# Patient Record
Sex: FEMALE | URBAN - METROPOLITAN AREA
[De-identification: names, ages, dates, MRNs, and addresses within clinical notes are randomized per-mention and may not be internally consistent; named-entity substitution may affect disease eponyms.]

---

## 2021-05-12 ENCOUNTER — APPOINTMENT (RX ONLY)
Dept: URBAN - METROPOLITAN AREA CLINIC 158 | Facility: CLINIC | Age: 76
Setting detail: DERMATOLOGY
End: 2021-05-12

## 2021-05-12 DIAGNOSIS — L57.8 OTHER SKIN CHANGES DUE TO CHRONIC EXPOSURE TO NONIONIZING RADIATION: ICD-10-CM

## 2021-05-12 DIAGNOSIS — L57.0 ACTINIC KERATOSIS: ICD-10-CM

## 2021-05-12 DIAGNOSIS — L82.1 OTHER SEBORRHEIC KERATOSIS: ICD-10-CM

## 2021-05-12 PROBLEM — D48.5 NEOPLASM OF UNCERTAIN BEHAVIOR OF SKIN: Status: ACTIVE | Noted: 2021-05-12

## 2021-05-12 PROCEDURE — ? BIOPSY BY SHAVE METHOD

## 2021-05-12 PROCEDURE — ? COUNSELING

## 2021-05-12 PROCEDURE — ? TREATMENT REGIMEN

## 2021-05-12 PROCEDURE — ? LIQUID NITROGEN

## 2021-05-12 PROCEDURE — 11103 TANGNTL BX SKIN EA SEP/ADDL: CPT

## 2021-05-12 PROCEDURE — 99203 OFFICE O/P NEW LOW 30 MIN: CPT | Mod: 25

## 2021-05-12 PROCEDURE — 11102 TANGNTL BX SKIN SINGLE LES: CPT

## 2021-05-12 PROCEDURE — 17000 DESTRUCT PREMALG LESION: CPT | Mod: 59

## 2021-05-12 ASSESSMENT — LOCATION ZONE DERM
LOCATION ZONE: TRUNK
LOCATION ZONE: NOSE

## 2021-05-12 ASSESSMENT — LOCATION SIMPLE DESCRIPTION DERM
LOCATION SIMPLE: NOSE
LOCATION SIMPLE: CHEST

## 2021-05-12 ASSESSMENT — LOCATION DETAILED DESCRIPTION DERM
LOCATION DETAILED: UPPER STERNUM
LOCATION DETAILED: NASAL SUPRATIP

## 2021-05-14 ENCOUNTER — RX ONLY (OUTPATIENT)
Age: 76
Setting detail: RX ONLY
End: 2021-05-14

## 2021-05-14 RX ORDER — CLOBETASOL PROPIONATE 0.5 MG/G
1 CREAM TOPICAL BID
Qty: 1 | Refills: 1 | Status: ERX | COMMUNITY
Start: 2021-05-14

## 2021-08-04 ENCOUNTER — APPOINTMENT (RX ONLY)
Dept: URBAN - METROPOLITAN AREA CLINIC 158 | Facility: CLINIC | Age: 76
Setting detail: DERMATOLOGY
End: 2021-08-04

## 2021-08-04 DIAGNOSIS — L43.8 OTHER LICHEN PLANUS: ICD-10-CM

## 2021-08-04 DIAGNOSIS — L28.1 PRURIGO NODULARIS: ICD-10-CM

## 2021-08-04 DIAGNOSIS — L56.8 OTHER SPECIFIED ACUTE SKIN CHANGES DUE TO ULTRAVIOLET RADIATION: ICD-10-CM

## 2021-08-04 DIAGNOSIS — K13.0 DISEASES OF LIPS: ICD-10-CM

## 2021-08-04 PROCEDURE — ? COUNSELING

## 2021-08-04 PROCEDURE — ? ORDER TESTS

## 2021-08-04 PROCEDURE — ? OTHER

## 2021-08-04 PROCEDURE — ? TREATMENT REGIMEN

## 2021-08-04 PROCEDURE — 99214 OFFICE O/P EST MOD 30 MIN: CPT

## 2021-08-04 PROCEDURE — ? PRESCRIPTION

## 2021-08-04 RX ORDER — HYDROCORTISONE 25 MG/G
1 OINTMENT TOPICAL QD
Qty: 1 | Refills: 1 | Status: ERX | COMMUNITY
Start: 2021-08-04

## 2021-08-04 RX ORDER — FLUOROURACIL 2 G/40G
1 CREAM TOPICAL BID
Qty: 1 | Refills: 0 | Status: ERX | COMMUNITY
Start: 2021-08-04

## 2021-08-04 RX ADMIN — HYDROCORTISONE 1: 25 OINTMENT TOPICAL at 00:00

## 2021-08-04 RX ADMIN — FLUOROURACIL 1: 2 CREAM TOPICAL at 00:00

## 2021-08-04 ASSESSMENT — LOCATION DETAILED DESCRIPTION DERM
LOCATION DETAILED: SUPERIOR THORACIC SPINE
LOCATION DETAILED: LEFT DISTAL PRETIBIAL REGION
LOCATION DETAILED: LEFT PROXIMAL DORSAL FOREARM
LOCATION DETAILED: RIGHT INFERIOR VERMILION LIP
LOCATION DETAILED: RIGHT DISTAL PRETIBIAL REGION
LOCATION DETAILED: RIGHT MEDIAL SUPERIOR CHEST
LOCATION DETAILED: LEFT SUPERIOR VERMILION LIP
LOCATION DETAILED: RIGHT PROXIMAL PRETIBIAL REGION
LOCATION DETAILED: RIGHT SUPERIOR VERMILION LIP
LOCATION DETAILED: RIGHT PROXIMAL DORSAL FOREARM

## 2021-08-04 ASSESSMENT — LOCATION SIMPLE DESCRIPTION DERM
LOCATION SIMPLE: LEFT LIP
LOCATION SIMPLE: RIGHT FOREARM
LOCATION SIMPLE: CHEST
LOCATION SIMPLE: RIGHT PRETIBIAL REGION
LOCATION SIMPLE: RIGHT LIP
LOCATION SIMPLE: LEFT PRETIBIAL REGION
LOCATION SIMPLE: UPPER BACK
LOCATION SIMPLE: LEFT FOREARM

## 2021-08-04 ASSESSMENT — LOCATION ZONE DERM
LOCATION ZONE: LIP
LOCATION ZONE: ARM
LOCATION ZONE: TRUNK
LOCATION ZONE: LEG

## 2021-08-04 NOTE — PROCEDURE: TREATMENT REGIMEN
Otc Regimen: Moisturizer daily to help prevent scratching
Detail Level: Zone
Initiate Treatment: Clobetasol cream - apply twice a day for 2 weeks to affected areas (never to face or groin )

## 2021-08-04 NOTE — PROCEDURE: OTHER
Detail Level: Zone
Other (Free Text): Lichen planus has been spreading. She tried Clobetasol x 2 weeks in the past, which did not help. She is very pruritic and has developed some PG. Discussed using chilled moisturizer and restarting the clobetasol If not better, will consider other options. Will check Hep C today too. Follow up in 2 weeks, sooner if needed.
Note Text (......Xxx Chief Complaint.): This diagnosis correlates with the
Render Risk Assessment In Note?: no

## 2021-08-04 NOTE — PROCEDURE: ORDER TESTS
Performing Laboratory: [id number]
Bill For Surgical Tray: no
Billing Type: Third-Party Bill
Expected Date Of Service: 08/04/2021

## 2021-08-21 ENCOUNTER — OFFICE VISIT (OUTPATIENT)
Dept: URBAN - METROPOLITAN AREA CLINIC 51 | Facility: CLINIC | Age: 76
End: 2021-08-21
Payer: MEDICARE

## 2021-08-21 DIAGNOSIS — H25.13 AGE-RELATED NUCLEAR CATARACT, BILATERAL: ICD-10-CM

## 2021-08-21 PROCEDURE — 99204 OFFICE O/P NEW MOD 45 MIN: CPT | Performed by: OPTOMETRIST

## 2021-08-21 PROCEDURE — 92020 GONIOSCOPY: CPT | Performed by: OPTOMETRIST

## 2021-08-21 ASSESSMENT — VISUAL ACUITY
OD: 20/25
OS: 20/20

## 2021-08-21 ASSESSMENT — INTRAOCULAR PRESSURE
OD: 18
OS: 18

## 2021-08-21 NOTE — IMPRESSION/PLAN
Impression: Anatomical narrow angle, bilateral: H40.033. Plan: Due to Parul Celia and gonio view OU. Refer to glaucoma for LPI consideration. Pt educ on condition and the importance of no DFE at this time. Reviewed importance for safe DFE. No gtts needed at this time. Pt will need to return to general clinic after LPIs for completeness of ocular health assessment. Pt understands process and next appointment is for consult only and not for LPIs.

## 2021-08-25 ENCOUNTER — APPOINTMENT (RX ONLY)
Dept: URBAN - METROPOLITAN AREA CLINIC 158 | Facility: CLINIC | Age: 76
Setting detail: DERMATOLOGY
End: 2021-08-25

## 2021-08-25 DIAGNOSIS — L43.8 OTHER LICHEN PLANUS: ICD-10-CM | Status: IMPROVED

## 2021-08-25 DIAGNOSIS — L30.9 DERMATITIS, UNSPECIFIED: ICD-10-CM

## 2021-08-25 DIAGNOSIS — K13.0 DISEASES OF LIPS: ICD-10-CM | Status: WELL CONTROLLED

## 2021-08-25 PROCEDURE — ? OTHER

## 2021-08-25 PROCEDURE — 99213 OFFICE O/P EST LOW 20 MIN: CPT

## 2021-08-25 PROCEDURE — ? COUNSELING

## 2021-08-25 ASSESSMENT — LOCATION SIMPLE DESCRIPTION DERM
LOCATION SIMPLE: RIGHT LIP
LOCATION SIMPLE: UPPER BACK
LOCATION SIMPLE: CHEST
LOCATION SIMPLE: LEFT FOREARM
LOCATION SIMPLE: LEFT PRETIBIAL REGION
LOCATION SIMPLE: RIGHT PRETIBIAL REGION
LOCATION SIMPLE: LEFT LIP
LOCATION SIMPLE: RIGHT FOREARM

## 2021-08-25 ASSESSMENT — LOCATION DETAILED DESCRIPTION DERM
LOCATION DETAILED: RIGHT DISTAL PRETIBIAL REGION
LOCATION DETAILED: LEFT SUPERIOR VERMILION LIP
LOCATION DETAILED: RIGHT MEDIAL SUPERIOR CHEST
LOCATION DETAILED: SUPERIOR THORACIC SPINE
LOCATION DETAILED: LEFT INFERIOR VERMILION LIP
LOCATION DETAILED: RIGHT PROXIMAL DORSAL FOREARM
LOCATION DETAILED: LEFT DISTAL PRETIBIAL REGION
LOCATION DETAILED: LEFT PROXIMAL DORSAL FOREARM
LOCATION DETAILED: RIGHT SUPERIOR VERMILION LIP

## 2021-08-25 ASSESSMENT — LOCATION ZONE DERM
LOCATION ZONE: ARM
LOCATION ZONE: LEG
LOCATION ZONE: TRUNK
LOCATION ZONE: LIP

## 2021-08-25 NOTE — PROCEDURE: OTHER
Detail Level: Zone
Other (Free Text): TODAYS NOTE- Lichen planus has improved. Patient has been applying clobetasol a few times a week. Advised to use clobetasol 2-3 times a week as needed. \\n\\nLNV-Lichen planus has been spreading. She tried Clobetasol x 2 weeks in the past, which did not help. She is very pruritic and has developed some PG. Discussed using chilled moisturizer and restarting the clobetasol If not better, will consider other options. Will check Hep C today too. Follow up in 2 weeks, sooner if needed.
Note Text (......Xxx Chief Complaint.): This diagnosis correlates with the
Render Risk Assessment In Note?: no
Other (Free Text): Patient has been applying hydrocortisone and has had improvement.
Other (Free Text): Noticed a new lesion on the inside of the lower lip on Monday.  Advised to continue hydrocortisone around the lip. Start an antihistamine once a day. If no improvement, she will return for follow up.

## 2021-09-17 ENCOUNTER — OFFICE VISIT (OUTPATIENT)
Dept: URBAN - METROPOLITAN AREA CLINIC 44 | Facility: CLINIC | Age: 76
End: 2021-09-17
Payer: MEDICARE

## 2021-09-17 PROCEDURE — 76514 ECHO EXAM OF EYE THICKNESS: CPT | Performed by: OPHTHALMOLOGY

## 2021-09-17 PROCEDURE — 99204 OFFICE O/P NEW MOD 45 MIN: CPT | Performed by: OPHTHALMOLOGY

## 2021-09-17 PROCEDURE — 92082 INTERMEDIATE VISUAL FIELD XM: CPT | Performed by: OPHTHALMOLOGY

## 2021-09-17 PROCEDURE — 92020 GONIOSCOPY: CPT | Performed by: OPHTHALMOLOGY

## 2021-09-17 PROCEDURE — 92133 CPTRZD OPH DX IMG PST SGM ON: CPT | Performed by: OPHTHALMOLOGY

## 2021-09-17 RX ORDER — PREDNISOLONE ACETATE 10 MG/ML
1 % SUSPENSION/ DROPS OPHTHALMIC
Qty: 5 | Refills: 1 | Status: INACTIVE
Start: 2021-09-17 | End: 2021-12-09

## 2021-09-17 ASSESSMENT — INTRAOCULAR PRESSURE
OS: 20
OD: 20

## 2021-09-17 NOTE — IMPRESSION/PLAN
Impression: Age-related nuclear cataract, bilateral: H25.13. Plan: eval in general clinic after LPIs.

## 2021-09-17 NOTE — IMPRESSION/PLAN
Impression: Anatomical narrow angle, bilateral: H40.033.
+copd
+possible family hx (maternal grandmother?)  Plan: PLAN: Appropriate testing ordered upon review of referring notes ; FDT has scatter ou and Angles appear narrow so recommend LPI OU. Signs and symptoms of angle closure discussed. Call or RTC ASAP if symptoms occur. Avoid medications with glaucoma warnings. Glaucoma booklet given. Recommend and pt desires to proceed with LPI OD then OS. R/B/A's discussed. Discussed may need more than one session per eye. Discussed mixed mechanism also. TESTS:
9/17/21 FDT - OD: Fair-inf nasal scatter; OS: Fair-central scatter 9/17/21 Pachymetry - OD: 499; thin; OS: 509; thin 9/17/21 OCT - OD: Poor-52 ; poor scan; OS: Fair-92 ; normal

## 2021-09-22 ENCOUNTER — ADULT PHYSICAL (OUTPATIENT)
Dept: URBAN - METROPOLITAN AREA CLINIC 44 | Facility: CLINIC | Age: 76
End: 2021-09-22
Payer: MEDICARE

## 2021-09-22 DIAGNOSIS — Z01.818 ENCOUNTER FOR OTHER PREPROCEDURAL EXAMINATION: Primary | ICD-10-CM

## 2021-09-22 PROCEDURE — 99203 OFFICE O/P NEW LOW 30 MIN: CPT | Performed by: PHYSICIAN ASSISTANT

## 2021-09-22 RX ORDER — LEVOTHYROXINE SODIUM 88 UG/1
CAPSULE ORAL
Qty: 0 | Refills: 0 | Status: ACTIVE
Start: 2021-09-22

## 2021-09-30 ENCOUNTER — SURGERY (OUTPATIENT)
Dept: URBAN - METROPOLITAN AREA SURGERY 19 | Facility: SURGERY | Age: 76
End: 2021-09-30
Payer: MEDICARE

## 2021-09-30 PROCEDURE — 66761 REVISION OF IRIS: CPT | Performed by: OPHTHALMOLOGY

## 2021-10-07 ENCOUNTER — SURGERY (OUTPATIENT)
Dept: URBAN - METROPOLITAN AREA SURGERY 19 | Facility: SURGERY | Age: 76
End: 2021-10-07
Payer: MEDICARE

## 2021-10-07 PROCEDURE — 66761 REVISION OF IRIS: CPT | Performed by: OPHTHALMOLOGY

## 2021-11-01 ENCOUNTER — OFFICE VISIT (OUTPATIENT)
Dept: URBAN - METROPOLITAN AREA CLINIC 44 | Facility: CLINIC | Age: 76
End: 2021-11-01
Payer: MEDICARE

## 2021-11-01 DIAGNOSIS — H40.033 ANATOMICAL NARROW ANGLE, BILATERAL: Primary | ICD-10-CM

## 2021-11-01 PROCEDURE — 99213 OFFICE O/P EST LOW 20 MIN: CPT | Performed by: OPHTHALMOLOGY

## 2021-11-01 ASSESSMENT — INTRAOCULAR PRESSURE
OD: 19
OS: 19

## 2021-11-01 NOTE — IMPRESSION/PLAN
Impression: Anatomical narrow angle, bilateral: H40.033.
+copd
+possible family hx (maternal grandmother?) Plan: PLAN: s/p lpi ou , patent ou, well -healed and may return to general clinic with Dr Delvin Saavedra in 1-2 mons [facility] and ongoing eye exam. Discussed mixed mechanism also. TESTS:
9/17/21 FDT - OD: Fair-inf nasal scatter; OS: Fair-central scatter 9/17/21 Pachymetry - OD: 499; thin; OS: 509; thin 9/17/21 OCT - OD: Poor-52 ; poor scan; OS: Fair-92 ; normal.

## 2021-12-09 ENCOUNTER — OFFICE VISIT (OUTPATIENT)
Dept: URBAN - METROPOLITAN AREA CLINIC 51 | Facility: CLINIC | Age: 76
End: 2021-12-09
Payer: MEDICARE

## 2021-12-09 DIAGNOSIS — H52.4 PRESBYOPIA: ICD-10-CM

## 2021-12-09 DIAGNOSIS — H43.813 VITREOUS DEGENERATION, BILATERAL: ICD-10-CM

## 2021-12-09 PROCEDURE — 99214 OFFICE O/P EST MOD 30 MIN: CPT | Performed by: OPTOMETRIST

## 2021-12-09 PROCEDURE — 92083 EXTENDED VISUAL FIELD XM: CPT | Performed by: OPTOMETRIST

## 2021-12-09 ASSESSMENT — INTRAOCULAR PRESSURE
OD: 19
OS: 17

## 2021-12-09 ASSESSMENT — VISUAL ACUITY
OS: 20/20
OD: 20/20

## 2021-12-09 NOTE — IMPRESSION/PLAN
Impression: Anatomical narrow angle, bilateral: H40.033.
* (+) COPD
*(+)possible family hx (maternal grandmother?) *FDT (9/17/21) OD: Fair-inf nasal scatter; 
OS: Fair-central scatter *Pachymetry (9/17/21) 633/933 *HVF 24-2 (12/09/2021) OD: excessive high false positives; otherwise full OS: central scatter *OCT RNFL (9/17/21) OD: Poor-52 ; poor scan; 
OS: Fair-92 ; normal. Plan: PLAN: s/p lpi ou , patent ou, well -healed. Able to dilate today without complications. 
Check annually with OCT RNFL and HVF 24-2

## 2022-12-12 ENCOUNTER — OFFICE VISIT (OUTPATIENT)
Dept: URBAN - METROPOLITAN AREA CLINIC 51 | Facility: CLINIC | Age: 77
End: 2022-12-12
Payer: MEDICARE

## 2022-12-12 DIAGNOSIS — H04.123 TEAR FILM INSUFFICIENCY OF BILATERAL LACRIMAL GLANDS: ICD-10-CM

## 2022-12-12 DIAGNOSIS — H43.813 VITREOUS DEGENERATION, BILATERAL: ICD-10-CM

## 2022-12-12 DIAGNOSIS — H25.813 COMBINED FORMS OF AGE-RELATED CATARACT, BILATERAL: Primary | ICD-10-CM

## 2022-12-12 DIAGNOSIS — H52.4 PRESBYOPIA: ICD-10-CM

## 2022-12-12 DIAGNOSIS — H40.033 ANATOMICAL NARROW ANGLE, BILATERAL: ICD-10-CM

## 2022-12-12 DIAGNOSIS — H35.443 AGE-RELATED RETICULAR DEGENERATION OF RETINA, BILATERAL: ICD-10-CM

## 2022-12-12 PROCEDURE — 92083 EXTENDED VISUAL FIELD XM: CPT | Performed by: OPTOMETRIST

## 2022-12-12 PROCEDURE — 92133 CPTRZD OPH DX IMG PST SGM ON: CPT | Performed by: OPTOMETRIST

## 2022-12-12 PROCEDURE — 92134 CPTRZ OPH DX IMG PST SGM RTA: CPT | Performed by: OPTOMETRIST

## 2022-12-12 PROCEDURE — 99214 OFFICE O/P EST MOD 30 MIN: CPT | Performed by: OPTOMETRIST

## 2022-12-12 ASSESSMENT — INTRAOCULAR PRESSURE
OD: 17
OS: 16

## 2022-12-12 ASSESSMENT — VISUAL ACUITY
OS: 20/25
OD: 20/25

## 2022-12-12 NOTE — IMPRESSION/PLAN
Impression: Anatomical narrow angle, bilateral: H40.033.
* (+) COPD
*(+)possible family hx (maternal grandmother?) *IOPs today 17mmHg OD and 16mmHg OS without medication *FDT (9/17/21) OD: Fair-inf nasal scatter; 
OS: Fair-central scatter *Pachymetry (9/17/21) 022/965 *HVF 24-2 (12/09/2021) OD: excessive high false positives; otherwise full OS: central scatter *OCT RNFL (12/12/2022) OD: 93um ; no thinning OS: 90um ; no thinning *OCT RNFL (9/17/21) OD: Poor-52 ; poor scan; 
OS: Fair-92 ; normal. Plan: I counseled the patient regarding the following: Glaucoma suspects do not usually need to be treated but do need close follow up monitoring for early signs of glaucoma damage. Some glaucoma suspects require treatment, and the same medical options for glaucoma eye drops and pills are utilized. Laser procedures and intraocular surgery are usually reserved for patients with uncontrolled glaucoma. Expectations: Glaucoma is usually diagnosed when the following three criteria are present: elevated intraocular pressure, optic nerve damage, and visual field loss. When some of these criteria are missing, it can be challenging to make the diagnosis of glaucoma. Glaucoma suspects may also include individuals with other risk factors such as a family history of glaucoma, temporary elevations of IO when taking steroid medications, pseudoexfoliation syndrome, pigmentary dispersion syndrome, thin central corneal thickness, and optic disc hemorrhages. [place] [facility] [place]

## 2024-03-25 ENCOUNTER — OFFICE VISIT (OUTPATIENT)
Dept: URBAN - METROPOLITAN AREA CLINIC 51 | Facility: CLINIC | Age: 79
End: 2024-03-25
Payer: MEDICARE

## 2024-03-25 DIAGNOSIS — H40.033 ANATOMICAL NARROW ANGLE, BILATERAL: ICD-10-CM

## 2024-03-25 DIAGNOSIS — H43.813 VITREOUS DEGENERATION, BILATERAL: ICD-10-CM

## 2024-03-25 DIAGNOSIS — H52.4 PRESBYOPIA: ICD-10-CM

## 2024-03-25 DIAGNOSIS — H25.813 COMBINED FORMS OF AGE-RELATED CATARACT, BILATERAL: Primary | ICD-10-CM

## 2024-03-25 DIAGNOSIS — H04.123 TEAR FILM INSUFFICIENCY OF BILATERAL LACRIMAL GLANDS: ICD-10-CM

## 2024-03-25 PROCEDURE — 92133 CPTRZD OPH DX IMG PST SGM ON: CPT | Performed by: OPTOMETRIST

## 2024-03-25 PROCEDURE — 92134 CPTRZ OPH DX IMG PST SGM RTA: CPT | Performed by: OPTOMETRIST

## 2024-03-25 PROCEDURE — 99214 OFFICE O/P EST MOD 30 MIN: CPT | Performed by: OPTOMETRIST

## 2024-03-25 ASSESSMENT — INTRAOCULAR PRESSURE
OD: 19
OS: 20

## 2024-03-25 ASSESSMENT — KERATOMETRY
OD: 46.38
OS: 45.63

## 2024-03-25 ASSESSMENT — VISUAL ACUITY
OD: 20/25
OS: 20/50

## 2024-04-24 ENCOUNTER — ADULT PHYSICAL (OUTPATIENT)
Dept: URBAN - METROPOLITAN AREA CLINIC 51 | Facility: CLINIC | Age: 79
End: 2024-04-24
Payer: MEDICARE

## 2024-04-24 DIAGNOSIS — Z01.818 ENCOUNTER FOR OTHER PREPROCEDURAL EXAMINATION: Primary | ICD-10-CM

## 2024-04-24 DIAGNOSIS — H25.13 AGE-RELATED NUCLEAR CATARACT, BILATERAL: ICD-10-CM

## 2024-04-24 PROCEDURE — 99213 OFFICE O/P EST LOW 20 MIN: CPT | Performed by: PHYSICIAN ASSISTANT

## 2024-05-16 ENCOUNTER — OFFICE VISIT (OUTPATIENT)
Dept: URBAN - METROPOLITAN AREA CLINIC 44 | Facility: CLINIC | Age: 79
End: 2024-05-16
Payer: MEDICARE

## 2024-05-16 DIAGNOSIS — H35.3121 NONEXUDATIVE MACULAR DEGENERATION, EARLY DRY STAGE, LEFT EYE: ICD-10-CM

## 2024-05-16 DIAGNOSIS — H43.813 VITREOUS DEGENERATION, BILATERAL: ICD-10-CM

## 2024-05-16 DIAGNOSIS — H04.123 TEAR FILM INSUFFICIENCY OF BILATERAL LACRIMAL GLANDS: ICD-10-CM

## 2024-05-16 DIAGNOSIS — H25.811 COMBINED FORMS OF AGE-RELATED CATARACT, RIGHT EYE: ICD-10-CM

## 2024-05-16 DIAGNOSIS — H25.813 COMBINED FORMS OF AGE-RELATED CATARACT, BILATERAL: Primary | ICD-10-CM

## 2024-05-16 DIAGNOSIS — H52.203 BILATERAL ASTIGMATISM: ICD-10-CM

## 2024-05-16 DIAGNOSIS — H35.372 PUCKERING OF MACULA, LEFT EYE: ICD-10-CM

## 2024-05-16 DIAGNOSIS — H40.033 ANATOMICAL NARROW ANGLE, BILATERAL: ICD-10-CM

## 2024-05-16 PROCEDURE — 92083 EXTENDED VISUAL FIELD XM: CPT | Performed by: OPHTHALMOLOGY

## 2024-05-16 PROCEDURE — 99214 OFFICE O/P EST MOD 30 MIN: CPT | Performed by: OPHTHALMOLOGY

## 2024-05-16 RX ORDER — DICLOFENAC SODIUM 1 MG/ML
0.1 % SOLUTION/ DROPS OPHTHALMIC
Qty: 5 | Refills: 2 | Status: ACTIVE
Start: 2024-05-16

## 2024-05-16 RX ORDER — PREDNISOLONE ACETATE 10 MG/ML
1 % SUSPENSION/ DROPS OPHTHALMIC
Qty: 5 | Refills: 2 | Status: ACTIVE
Start: 2024-05-16

## 2024-05-16 ASSESSMENT — INTRAOCULAR PRESSURE
OS: 23
OD: 24

## 2024-06-07 ENCOUNTER — POST-OPERATIVE VISIT (OUTPATIENT)
Dept: URBAN - METROPOLITAN AREA CLINIC 51 | Facility: CLINIC | Age: 79
End: 2024-06-07
Payer: MEDICARE

## 2024-06-07 DIAGNOSIS — Z48.810 ENCOUNTER FOR SURGICAL AFTERCARE FOLLOWING SURGERY ON A SENSE ORGAN: ICD-10-CM

## 2024-06-07 DIAGNOSIS — H52.4 PRESBYOPIA: Primary | ICD-10-CM

## 2024-06-07 PROCEDURE — 99024 POSTOP FOLLOW-UP VISIT: CPT

## 2024-06-07 ASSESSMENT — INTRAOCULAR PRESSURE
OS: 19
OD: 19

## 2024-06-14 ENCOUNTER — POST-OPERATIVE VISIT (OUTPATIENT)
Dept: URBAN - METROPOLITAN AREA CLINIC 51 | Facility: CLINIC | Age: 79
End: 2024-06-14
Payer: MEDICARE

## 2024-06-14 DIAGNOSIS — Z48.810 ENCOUNTER FOR SURGICAL AFTERCARE FOLLOWING SURGERY ON A SENSE ORGAN: Primary | ICD-10-CM

## 2024-06-14 PROCEDURE — 99024 POSTOP FOLLOW-UP VISIT: CPT | Performed by: OPTOMETRIST

## 2024-06-14 ASSESSMENT — INTRAOCULAR PRESSURE
OD: 20
OS: 22

## 2024-06-14 ASSESSMENT — KERATOMETRY
OS: 46.00
OD: 46.38

## 2024-06-14 ASSESSMENT — VISUAL ACUITY: OS: 20/25

## 2024-06-20 ENCOUNTER — SURGERY (OUTPATIENT)
Dept: URBAN - METROPOLITAN AREA SURGERY 19 | Facility: SURGERY | Age: 79
End: 2024-06-20
Payer: MEDICARE

## 2024-06-20 PROCEDURE — 66984 XCAPSL CTRC RMVL W/O ECP: CPT | Performed by: OPHTHALMOLOGY

## 2024-06-21 ENCOUNTER — POST-OPERATIVE VISIT (OUTPATIENT)
Dept: URBAN - METROPOLITAN AREA CLINIC 51 | Facility: CLINIC | Age: 79
End: 2024-06-21
Payer: MEDICARE

## 2024-06-21 DIAGNOSIS — Z96.1 PRESENCE OF INTRAOCULAR LENS: Primary | ICD-10-CM

## 2024-06-21 PROCEDURE — 99024 POSTOP FOLLOW-UP VISIT: CPT | Performed by: OPTOMETRIST

## 2024-06-21 ASSESSMENT — INTRAOCULAR PRESSURE: OD: 23

## 2024-07-15 ENCOUNTER — POST-OPERATIVE VISIT (OUTPATIENT)
Dept: URBAN - METROPOLITAN AREA CLINIC 51 | Facility: CLINIC | Age: 79
End: 2024-07-15
Payer: MEDICARE

## 2024-07-15 DIAGNOSIS — H52.4 PRESBYOPIA: Primary | ICD-10-CM

## 2024-07-15 DIAGNOSIS — Z96.1 PRESENCE OF INTRAOCULAR LENS: ICD-10-CM

## 2024-07-15 PROCEDURE — 99024 POSTOP FOLLOW-UP VISIT: CPT | Performed by: OPTOMETRIST

## 2024-07-15 ASSESSMENT — VISUAL ACUITY: OD: 20/20

## 2024-07-15 ASSESSMENT — INTRAOCULAR PRESSURE
OS: 22
OD: 23

## 2025-03-27 ENCOUNTER — OFFICE VISIT (OUTPATIENT)
Dept: URBAN - METROPOLITAN AREA CLINIC 51 | Facility: CLINIC | Age: 80
End: 2025-03-27
Payer: MEDICARE

## 2025-03-27 DIAGNOSIS — H52.4 PRESBYOPIA: ICD-10-CM

## 2025-03-27 DIAGNOSIS — H04.123 TEAR FILM INSUFFICIENCY OF BILATERAL LACRIMAL GLANDS: Primary | ICD-10-CM

## 2025-03-27 DIAGNOSIS — H43.813 VITREOUS DEGENERATION, BILATERAL: ICD-10-CM

## 2025-03-27 DIAGNOSIS — Z96.1 PRESENCE OF INTRAOCULAR LENS: ICD-10-CM

## 2025-03-27 DIAGNOSIS — H35.362 DRUSEN (DEGENERATIVE) OF MACULA, LEFT EYE: ICD-10-CM

## 2025-03-27 PROCEDURE — 92014 COMPRE OPH EXAM EST PT 1/>: CPT | Performed by: OPTOMETRIST

## 2025-03-27 PROCEDURE — 92134 CPTRZ OPH DX IMG PST SGM RTA: CPT | Performed by: OPTOMETRIST

## 2025-03-27 ASSESSMENT — VISUAL ACUITY
OS: 20/20
OD: 20/20

## 2025-03-27 ASSESSMENT — KERATOMETRY
OD: 46.13
OS: 45.88

## 2025-03-27 ASSESSMENT — INTRAOCULAR PRESSURE
OD: 15
OS: 16